# Patient Record
Sex: MALE | Race: WHITE | NOT HISPANIC OR LATINO | Employment: FULL TIME | URBAN - METROPOLITAN AREA
[De-identification: names, ages, dates, MRNs, and addresses within clinical notes are randomized per-mention and may not be internally consistent; named-entity substitution may affect disease eponyms.]

---

## 2024-02-29 ENCOUNTER — OFFICE VISIT (OUTPATIENT)
Dept: PODIATRY | Facility: CLINIC | Age: 39
End: 2024-02-29
Payer: COMMERCIAL

## 2024-02-29 VITALS
BODY MASS INDEX: 27.57 KG/M2 | SYSTOLIC BLOOD PRESSURE: 129 MMHG | WEIGHT: 208 LBS | HEART RATE: 83 BPM | HEIGHT: 73 IN | DIASTOLIC BLOOD PRESSURE: 81 MMHG

## 2024-02-29 DIAGNOSIS — M72.2 PLANTAR FASCIITIS: Primary | ICD-10-CM

## 2024-02-29 DIAGNOSIS — M19.079 OSTEOARTHRITIS OF ANKLE AND FOOT, UNSPECIFIED LATERALITY: ICD-10-CM

## 2024-02-29 PROCEDURE — 1159F MED LIST DOCD IN RCRD: CPT | Mod: CPTII,S$GLB,, | Performed by: PODIATRIST

## 2024-02-29 PROCEDURE — 1160F RVW MEDS BY RX/DR IN RCRD: CPT | Mod: CPTII,S$GLB,, | Performed by: PODIATRIST

## 2024-02-29 PROCEDURE — 3008F BODY MASS INDEX DOCD: CPT | Mod: CPTII,S$GLB,, | Performed by: PODIATRIST

## 2024-02-29 PROCEDURE — 99204 OFFICE O/P NEW MOD 45 MIN: CPT | Mod: S$GLB,,, | Performed by: PODIATRIST

## 2024-02-29 PROCEDURE — 99999 PR PBB SHADOW E&M-NEW PATIENT-LVL III: CPT | Mod: PBBFAC,,, | Performed by: PODIATRIST

## 2024-02-29 PROCEDURE — 3074F SYST BP LT 130 MM HG: CPT | Mod: CPTII,S$GLB,, | Performed by: PODIATRIST

## 2024-02-29 PROCEDURE — 3079F DIAST BP 80-89 MM HG: CPT | Mod: CPTII,S$GLB,, | Performed by: PODIATRIST

## 2024-02-29 RX ORDER — DICLOFENAC SODIUM 75 MG/1
75 TABLET, DELAYED RELEASE ORAL 2 TIMES DAILY
Qty: 60 TABLET | Refills: 0 | Status: SHIPPED | OUTPATIENT
Start: 2024-02-29 | End: 2024-03-30

## 2024-03-03 NOTE — PROGRESS NOTES
"Subjective:       Patient ID: Heber Rodriguez is a 38 y.o. male.    Chief Complaint: Foot Pain (Bilateral)    Patient presents today with a complaint of bilateral foot pain left greater than right.  Patient states he has been dealing with a left foot pain for about 1-1-1/2 months he states the especially painful on his 1st steps out of bed in the morning and he states it does not seem to matter what shoes he wears it hurts the same.  Patient is also complaining about pain around the big toe joint on the right foot but seems to come and go and he believes it is compensation due to the left foot pain.  Patient relates a history of a fractured 5th metatarsal that occurred 5-6 years ago but states that it healed fine.  History reviewed. No pertinent past medical history.  History reviewed. No pertinent surgical history.  History reviewed. No pertinent family history.  Social History     Socioeconomic History    Marital status:    Tobacco Use    Smoking status: Former     Types: Cigarettes     Passive exposure: Never    Smokeless tobacco: Former   Substance and Sexual Activity    Alcohol use: Yes    Drug use: Never    Sexual activity: Yes       Current Outpatient Medications   Medication Sig Dispense Refill    diclofenac (VOLTAREN) 75 MG EC tablet Take 1 tablet (75 mg total) by mouth 2 (two) times daily. 60 tablet 0    multivitamin-iron-folic acid Tab Take by mouth.       No current facility-administered medications for this visit.     Review of patient's allergies indicates:   Allergen Reactions    Amoxicillin Swelling    Azithromycin Swelling    Sulfa (sulfonamide antibiotics) Rash       Review of Systems   Musculoskeletal:  Positive for arthralgias and joint swelling.   All other systems reviewed and are negative.      Objective:      Vitals:    02/29/24 1531   BP: 129/81   BP Location: Right arm   Patient Position: Sitting   Pulse: 83   Weight: 94.3 kg (208 lb)   Height: 6' 1" (1.854 m)     Physical " Exam  Vitals and nursing note reviewed.   Constitutional:       Appearance: Normal appearance.   Cardiovascular:      Pulses:           Dorsalis pedis pulses are 2+ on the right side and 2+ on the left side.        Posterior tibial pulses are 2+ on the right side and 2+ on the left side.   Pulmonary:      Effort: Pulmonary effort is normal.   Musculoskeletal:         General: Swelling, tenderness and deformity present.      Right foot: Decreased range of motion. Deformity present.      Left foot: Deformity present.        Feet:    Feet:      Right foot:      Protective Sensation: 2 sites tested.  2 sites sensed.      Skin integrity: Erythema present.      Left foot:      Protective Sensation: 2 sites tested.  2 sites sensed.      Skin integrity: Erythema present.   Skin:     Capillary Refill: Capillary refill takes less than 2 seconds.      Findings: Erythema present.   Neurological:      General: No focal deficit present.      Mental Status: He is alert.   Psychiatric:         Mood and Affect: Mood normal.         Behavior: Behavior normal.                          Assessment:       1. Plantar fasciitis    2. Osteoarthritis of ankle and foot, unspecified laterality        Plan:       Patient presents today with a complaint of bilateral foot pain left greater than right.  Patient states he has been dealing with a left foot pain for about 1-1-1/2 months he states the especially painful on his 1st steps out of bed in the morning and he states it does not seem to matter what shoes he wears it hurts the same.  Patient is also complaining about pain around the big toe joint on the right foot but seems to come and go and he believes it is compensation due to the left foot pain.  Patient relates a history of a fractured 5th metatarsal that occurred 5-6 years ago but states that it healed fine.  A comprehensive new patient evaluation was performed today patient has findings consistent with plantar fasciitis the majority of  the patient's discomfort is in the plantar arch of the patient's left foot he has a lot of tenderness discomfort associated inflammation present along the course of the plantar fascia in the arch area left.  I discussed plantar fasciitis at length and in detail with the patient advising him if he has not getting appropriate support this will cause excessive pull inflammation of the plantar fascia and subsequent discomfort that is why when he 1st gets out of bed in the morning the plantar fascia has tightened when he goes to stand up it is pulling it is causing discomfort and inflammation.  I do believe the patient was having some pain in the big toe joint on the right foot related to compensating for the left foot he does have some degenerative arthritic changes noted on examination but I feel once the left foot is doing better and his pain resolves he will likely do much better regarding the right foot also.  I have started the patient on diclofenac which I gave him a prescription for patient was fitted for and dispensed power step control arch supports I have advised the patient we can always add additional support to these as needed but I want him to wear these see how he does with these before I add any additional support I do believe the additional support will not only address the plantar fasciitis but also the pain in the big toe joint right foot.  I do plan to follow up with the patient in several weeks I will make sure that he is doing better and improving certainly if he is any problems questions or concerns prior to the follow-up he is to contact us immediately.  Extensive chart review performed as well as fitting the patient for and dispensing power step control arch supports and documentation of today's visit.This note was created using [employer] voice recognition software that occasionally misinterpreted phrases or words.

## 2025-04-05 ENCOUNTER — HOSPITAL ENCOUNTER (EMERGENCY)
Facility: HOSPITAL | Age: 40
Discharge: HOME | End: 2025-04-05
Attending: EMERGENCY MEDICINE
Payer: COMMERCIAL

## 2025-04-05 VITALS
TEMPERATURE: 98 F | BODY MASS INDEX: 27.57 KG/M2 | SYSTOLIC BLOOD PRESSURE: 111 MMHG | HEIGHT: 73 IN | DIASTOLIC BLOOD PRESSURE: 74 MMHG | OXYGEN SATURATION: 96 % | HEART RATE: 94 BPM | WEIGHT: 208 LBS | RESPIRATION RATE: 19 BRPM

## 2025-04-05 DIAGNOSIS — S61.411A LACERATION OF RIGHT HAND WITHOUT FOREIGN BODY, INITIAL ENCOUNTER: Primary | ICD-10-CM

## 2025-04-05 PROCEDURE — 12002 RPR S/N/AX/GEN/TRNK2.6-7.5CM: CPT

## 2025-04-05 PROCEDURE — 99282 EMERGENCY DEPT VISIT SF MDM: CPT | Mod: 25

## 2025-04-06 NOTE — DISCHARGE INSTRUCTIONS
Keep your wound clean and dry.  Have your sutures removed in 10-14 days.  Follow up with your primary care provider as needed.

## 2025-04-06 NOTE — ED PROVIDER NOTES
Encounter Date: 4/5/2025       History     Chief Complaint   Patient presents with    Laceration     Laceration to right first digit on glass.     Patient presents to the emergency department for evaluation of laceration.  Patient states that he was carrying some chicken in from the grill and tripped and fell, striking his hand on the glass of a grandfather clock and cutting his hand.  He complains of a laceration to the lateral aspect of his right little finger.  He denies any numbness or tingling.  He denies any decreased range of motion.  He denies any other injury or complaint.  The cut occurred about 30 minutes prior to arrival.    The history is provided by the patient.     Review of patient's allergies indicates:   Allergen Reactions    Amoxicillin Swelling    Azithromycin Swelling    Sulfa (sulfonamide antibiotics) Rash     History reviewed. No pertinent past medical history.  History reviewed. No pertinent surgical history.  No family history on file.  Social History[1]  Review of Systems   Constitutional:  Negative for activity change, appetite change, diaphoresis and fever.   HENT:  Negative for congestion, ear discharge, ear pain, nosebleeds, rhinorrhea, sore throat and trouble swallowing.    Eyes:  Negative for photophobia, pain, discharge and redness.   Respiratory:  Negative for cough, choking, chest tightness, shortness of breath and wheezing.    Cardiovascular:  Negative for chest pain, palpitations and leg swelling.   Gastrointestinal:  Negative for abdominal pain, blood in stool, constipation, nausea and vomiting.   Endocrine: Negative for polydipsia and polyphagia.   Genitourinary:  Negative for dysuria, frequency and urgency.   Musculoskeletal:  Negative for back pain and neck pain.   Skin:  Positive for wound. Negative for rash.   Neurological:  Negative for dizziness, seizures, weakness, numbness and headaches.   All other systems reviewed and are negative.      Physical Exam     Initial  "Vitals [04/05/25 2155]   BP Pulse Resp Temp SpO2   111/74 94 19 98.3 °F (36.8 °C) 96 %      MAP       --         Physical Exam    Nursing note and vitals reviewed.  Constitutional: He appears well-developed and well-nourished. No distress.   HENT:   Head: Normocephalic and atraumatic.   Right Ear: External ear normal.   Left Ear: External ear normal. Mouth/Throat: Oropharynx is clear and moist.   Eyes: EOM are normal. Pupils are equal, round, and reactive to light. Right eye exhibits no discharge.   Neck: Neck supple. No tracheal deviation present. No JVD present.   Normal range of motion.  Cardiovascular:  Normal rate and regular rhythm.           No murmur heard.  Pulmonary/Chest: Breath sounds normal. No respiratory distress. He has no wheezes. He has no rales.   Abdominal: Abdomen is soft. Bowel sounds are normal. He exhibits no distension. There is no abdominal tenderness.   Musculoskeletal:         General: No tenderness. Normal range of motion.      Cervical back: Normal range of motion and neck supple.     Neurological: He is alert and oriented to person, place, and time. He has normal strength. No cranial nerve deficit.   Skin: Skin is warm and dry. Capillary refill takes less than 2 seconds. No rash noted.   There is a 5 cm "V" shaped laceration to the lateral aspect of the right little finger.  There is some bleeding noted.  There is no foreign body noted.  There is good range of motion against resistance.  Capillary refill is good.  Distal neuro is intact.         ED Course   Lac Repair    Date/Time: 4/5/2025 10:21 PM    Performed by: Heber Curry DO  Authorized by: Heber Curry DO    Consent:     Consent obtained:  Verbal    Consent given by:  Patient    Risks, benefits, and alternatives were discussed: yes      Risks discussed:  Infection, pain and poor cosmetic result    Alternatives discussed:  No treatment and delayed treatment  Universal protocol:     Procedure explained and " questions answered to patient or proxy's satisfaction: yes      Patient identity confirmed:  Verbally with patient  Anesthesia:     Anesthesia method:  Local infiltration    Local anesthetic:  Lidocaine 1% w/o epi  Laceration details:     Location:  Finger    Finger location:  R small finger    Length (cm):  5  Pre-procedure details:     Preparation:  Patient was prepped and draped in usual sterile fashion  Treatment:     Area cleansed with:  Soap and water    Amount of cleaning:  Extensive    Irrigation method:  Tap    Visualized foreign bodies/material removed: no      Debridement:  None  Skin repair:     Repair method:  Sutures    Suture size:  4-0    Suture material:  Nylon    Suture technique:  Simple interrupted    Number of sutures:  15  Approximation:     Approximation:  Close  Repair type:     Repair type:  Simple  Post-procedure details:     Dressing:  Splint for protection and adhesive bandage    Procedure completion:  Tolerated well, no immediate complications    Labs Reviewed - No data to display       Imaging Results    None          Medications - No data to display  Medical Decision Making  History is obtained from the patient.  Differential diagnosis includes, but is not limited to, skin laceration/tendon laceration/dislocation  Social determinants of healthcare were considered.  Patient has primary care provider and insurance and no decreased access to healthcare.    The wound was cleaned and anesthetized and sutured in the emergency department without difficulty.  There was good approximation of the wound edges.  There continued to be good range of motion against resistance in both flexion and extension after repair.  The wound was splinted and dressed prior to discharge.  Patient was instructed to have his sutures removed in 10-14 days.                                      Clinical Impression:  Final diagnoses:  [O40.926F] Laceration of right hand without foreign body, initial encounter  (Primary)          ED Disposition Condition    Discharge Stable          ED Prescriptions    None       Follow-up Information       Follow up With Specialties Details Why Contact Info    Primary care physician of choice  Schedule an appointment as soon as possible for a visit                  [1]   Social History  Tobacco Use    Smoking status: Former     Types: Cigarettes     Passive exposure: Never    Smokeless tobacco: Former   Substance Use Topics    Alcohol use: Yes    Drug use: Never        Heber Curry, DO  04/05/25 [id number]

## 2025-04-06 NOTE — ED NOTES
Wound cleansed with peroxide to remove excess blood along with sterile 0.9% NaCl. Tolerated well. Applied xeform and surgicel to aid in hemostasis. Good Cns remains to affected digit. Splint applied to finger and secured with tape and ace wrap. Post bandage reveals wnl CNS. Wound care instructions and suture removal explained.